# Patient Record
Sex: MALE | Race: WHITE
[De-identification: names, ages, dates, MRNs, and addresses within clinical notes are randomized per-mention and may not be internally consistent; named-entity substitution may affect disease eponyms.]

---

## 2017-12-13 ENCOUNTER — APPOINTMENT (OUTPATIENT)
Dept: FAMILY MEDICINE | Facility: CLINIC | Age: 32
End: 2017-12-13
Payer: COMMERCIAL

## 2017-12-13 VITALS
OXYGEN SATURATION: 98 % | WEIGHT: 160 LBS | RESPIRATION RATE: 14 BRPM | BODY MASS INDEX: 23.7 KG/M2 | DIASTOLIC BLOOD PRESSURE: 78 MMHG | HEIGHT: 69 IN | SYSTOLIC BLOOD PRESSURE: 116 MMHG | HEART RATE: 75 BPM

## 2017-12-13 DIAGNOSIS — M54.16 RADICULOPATHY, LUMBAR REGION: ICD-10-CM

## 2017-12-13 DIAGNOSIS — A69.20 LYME DISEASE, UNSPECIFIED: ICD-10-CM

## 2017-12-13 DIAGNOSIS — R51 HEADACHE: ICD-10-CM

## 2017-12-13 DIAGNOSIS — Z80.8 FAMILY HISTORY OF MALIGNANT NEOPLASM OF OTHER ORGANS OR SYSTEMS: ICD-10-CM

## 2017-12-13 PROBLEM — Z00.00 ENCOUNTER FOR PREVENTIVE HEALTH EXAMINATION: Status: ACTIVE | Noted: 2017-12-13

## 2017-12-13 PROCEDURE — 99213 OFFICE O/P EST LOW 20 MIN: CPT

## 2018-03-01 ENCOUNTER — APPOINTMENT (OUTPATIENT)
Dept: FAMILY MEDICINE | Facility: CLINIC | Age: 33
End: 2018-03-01
Payer: COMMERCIAL

## 2018-03-01 VITALS
DIASTOLIC BLOOD PRESSURE: 84 MMHG | HEIGHT: 69 IN | BODY MASS INDEX: 23.7 KG/M2 | SYSTOLIC BLOOD PRESSURE: 110 MMHG | HEART RATE: 82 BPM | RESPIRATION RATE: 16 BRPM | WEIGHT: 160 LBS | OXYGEN SATURATION: 98 %

## 2018-03-01 DIAGNOSIS — E78.5 HYPERLIPIDEMIA, UNSPECIFIED: ICD-10-CM

## 2018-03-01 DIAGNOSIS — R53.83 OTHER FATIGUE: ICD-10-CM

## 2018-03-01 DIAGNOSIS — E53.8 DEFICIENCY OF OTHER SPECIFIED B GROUP VITAMINS: ICD-10-CM

## 2018-03-01 DIAGNOSIS — E55.9 VITAMIN D DEFICIENCY, UNSPECIFIED: ICD-10-CM

## 2018-03-01 DIAGNOSIS — M79.1 MYALGIA: ICD-10-CM

## 2018-03-01 PROCEDURE — 99213 OFFICE O/P EST LOW 20 MIN: CPT

## 2018-03-01 RX ORDER — DOXYCYCLINE HYCLATE 100 MG/1
100 CAPSULE ORAL
Qty: 42 | Refills: 0 | Status: DISCONTINUED | COMMUNITY
Start: 2017-07-04 | End: 2018-03-01

## 2018-03-04 ENCOUNTER — TRANSCRIPTION ENCOUNTER (OUTPATIENT)
Age: 33
End: 2018-03-04

## 2018-03-07 ENCOUNTER — RESULT REVIEW (OUTPATIENT)
Age: 33
End: 2018-03-07

## 2018-07-23 PROBLEM — R51 HEADACHE, UNSPECIFIED HEADACHE TYPE: Status: ACTIVE | Noted: 2017-12-13

## 2018-08-23 ENCOUNTER — APPOINTMENT (OUTPATIENT)
Dept: FAMILY MEDICINE | Facility: CLINIC | Age: 33
End: 2018-08-23
Payer: COMMERCIAL

## 2018-08-23 VITALS
HEART RATE: 69 BPM | DIASTOLIC BLOOD PRESSURE: 70 MMHG | BODY MASS INDEX: 24.37 KG/M2 | WEIGHT: 165 LBS | SYSTOLIC BLOOD PRESSURE: 116 MMHG | OXYGEN SATURATION: 97 % | RESPIRATION RATE: 16 BRPM

## 2018-08-23 DIAGNOSIS — A69.20 LYME DISEASE, UNSPECIFIED: ICD-10-CM

## 2018-08-23 PROCEDURE — 99213 OFFICE O/P EST LOW 20 MIN: CPT

## 2018-08-23 NOTE — ASSESSMENT
[FreeTextEntry1] : Doxy rx given. Instructed not to take with iron, dairy, or magnesium 2hrs before or after dose. Advised sun protection, avoiding sun if possible. \par Keep area clean, dry.\par Tick bite preventative measures reviewed\par Blood work prescription given to patient. \par

## 2018-08-23 NOTE — PHYSICAL EXAM
[No Acute Distress] : no acute distress [Well Nourished] : well nourished [Well Developed] : well developed [Well-Appearing] : well-appearing [No Respiratory Distress] : no respiratory distress  [Clear to Auscultation] : lungs were clear to auscultation bilaterally [No Accessory Muscle Use] : no accessory muscle use [Normal Rate] : normal rate  [Regular Rhythm] : with a regular rhythm [Normal S1, S2] : normal S1 and S2 [No Murmur] : no murmur heard [de-identified] : [place] rash noted to posterior R knee - erythema migrans.

## 2018-08-23 NOTE — HISTORY OF PRESENT ILLNESS
[FreeTextEntry8] : Pt presents with rash on posterior R knee x 1 day, states he noticed rash last night. Denies any known tick bites but states [place] the woods frequently. Denies fevers, chills, joint pains, fatigue. \par \par

## 2018-10-01 ENCOUNTER — APPOINTMENT (OUTPATIENT)
Dept: FAMILY MEDICINE | Facility: CLINIC | Age: 33
End: 2018-10-01
Payer: COMMERCIAL

## 2018-10-01 VITALS
OXYGEN SATURATION: 98 % | BODY MASS INDEX: 25.18 KG/M2 | WEIGHT: 170 LBS | TEMPERATURE: 99 F | RESPIRATION RATE: 16 BRPM | HEART RATE: 63 BPM | DIASTOLIC BLOOD PRESSURE: 74 MMHG | HEIGHT: 69 IN | SYSTOLIC BLOOD PRESSURE: 112 MMHG

## 2018-10-01 DIAGNOSIS — R19.7 DIARRHEA, UNSPECIFIED: ICD-10-CM

## 2018-10-01 DIAGNOSIS — K44.9 DIAPHRAGMATIC HERNIA W/OUT OBSTRUCTION OR GANGRENE: ICD-10-CM

## 2018-10-01 DIAGNOSIS — R21 RASH AND OTHER NONSPECIFIC SKIN ERUPTION: ICD-10-CM

## 2018-10-01 DIAGNOSIS — K29.00 ACUTE GASTRITIS W/OUT BLEEDING: ICD-10-CM

## 2018-10-01 DIAGNOSIS — R10.9 UNSPECIFIED ABDOMINAL PAIN: ICD-10-CM

## 2018-10-01 PROCEDURE — 99214 OFFICE O/P EST MOD 30 MIN: CPT

## 2018-10-01 RX ORDER — OMEPRAZOLE 40 MG/1
40 CAPSULE, DELAYED RELEASE ORAL
Qty: 60 | Refills: 0 | Status: COMPLETED | COMMUNITY
Start: 2018-09-10

## 2018-10-01 RX ORDER — RIFAXIMIN 550 MG/1
550 TABLET ORAL
Qty: 42 | Refills: 0 | Status: COMPLETED | COMMUNITY
Start: 2018-08-13

## 2018-10-01 RX ORDER — FAMOTIDINE 40 MG/1
40 TABLET, FILM COATED ORAL
Qty: 90 | Refills: 0 | Status: COMPLETED | COMMUNITY
Start: 2018-08-13

## 2018-10-01 RX ORDER — SUCRALFATE 1 G/1
1 TABLET ORAL
Qty: 120 | Refills: 0 | Status: COMPLETED | COMMUNITY
Start: 2018-07-12

## 2018-10-01 RX ORDER — DICLOFENAC SODIUM 100 MG/1
100 TABLET, FILM COATED, EXTENDED RELEASE ORAL
Qty: 30 | Refills: 0 | Status: COMPLETED | COMMUNITY
Start: 2018-05-18

## 2018-10-01 RX ORDER — OMEPRAZOLE 40 MG/1
40 CAPSULE, DELAYED RELEASE ORAL
Qty: 90 | Refills: 0 | Status: ACTIVE | COMMUNITY

## 2018-10-01 RX ORDER — DOXYCYCLINE HYCLATE 100 MG/1
100 TABLET ORAL
Qty: 42 | Refills: 0 | Status: DISCONTINUED | COMMUNITY
Start: 2018-08-23 | End: 2018-10-01

## 2018-10-01 RX ORDER — SUCRALFATE 1 G/1
1 TABLET ORAL
Refills: 0 | Status: ACTIVE | COMMUNITY

## 2018-10-01 NOTE — REVIEW OF SYSTEMS
[Abdominal Pain] : abdominal pain [Diarrhea] : diarrhea [Negative] : Respiratory [Fatigue] : no fatigue

## 2018-10-01 NOTE — ASSESSMENT
[FreeTextEntry1] : note for work\par clear liquid diet and or rice broth only Do not eat food that is hard to digest for 24 hrs. \par Take pepto bismol for 24 hrs for symptoms. Take sucralfate qid today for symptoms. \par follow up dermatology for further eval of skin rashes. I am not sure if it lymes or not bc the rash is there after 21 days of treatment\par We spent sufficient time to discuss aspects of care; questions were answered  to patient's satisfaction.The diagnosis and care plan were discussed with patient in detail.  Patient test results were  reviewed and explained in full. All questions were answered.\par

## 2018-10-01 NOTE — PHYSICAL EXAM
[Ill-Appearing] : ill-appearing [Normal Rhythm/Effort] : normal respiratory rhythm and effort [Clear Bilaterally] : the lungs were clear to auscultation bilaterally [Normal to Percussion] : the lungs were normal to percussion [Normal] : palpation of the chest was normal [Normal Rate] : normal [Normal S1] : normal S1 [Normal S2] : normal S2 [S3] : no S3 [S4] : no S4 [No Murmur] : no murmurs heard [No Pitting Edema] : no pitting edema present [Right Carotid Bruit] : no bruit heard over the right carotid [Left Carotid Bruit] : no bruit heard over the left carotid [Right Femoral Bruit] : no bruit heard over the right femoral artery [Left Femoral Bruit] : no bruit heard over the left femoral artery [2+] : left 2+ [No Abnormalities] : the abdominal aorta was not enlarged and no bruit was heard

## 2018-10-01 NOTE — HISTORY OF PRESENT ILLNESS
[FreeTextEntry8] : +stomach pain +diarrhea X2 days\par \par I had woken up with swollen gland on right  neck on friday. Saturday ate jerk chicken pasta and cream sauce at  [facility],  coffee   then Sunday 8 am bad epigastric shooting sharp pains. I had diarrhea all day , no blood in stool, chills from the pain, no fever. it is mucousy.  I did eat chicken yesterday grilled. I am trying to drink liquids. I am a little hungry and then I get pain and lose appetitie.\par \par I also am getting blood work to follow up on lymes. I was on a lot of antibiotic. I did not take probiotic. I did start it two ago. \par \par I see gastroenterology for gastritis. I take omeprazole probiotic and sucralfate . I took pepto yesterday and it didn’t help.  \par \par He has various rashes on his legs. He was treated for lymes. the rash is still there

## 2021-01-04 ENCOUNTER — TRANSCRIPTION ENCOUNTER (OUTPATIENT)
Age: 36
End: 2021-01-04

## 2021-01-18 ENCOUNTER — TRANSCRIPTION ENCOUNTER (OUTPATIENT)
Age: 36
End: 2021-01-18